# Patient Record
Sex: FEMALE | Race: WHITE | NOT HISPANIC OR LATINO | Employment: UNEMPLOYED | URBAN - METROPOLITAN AREA
[De-identification: names, ages, dates, MRNs, and addresses within clinical notes are randomized per-mention and may not be internally consistent; named-entity substitution may affect disease eponyms.]

---

## 2023-08-29 ENCOUNTER — APPOINTMENT (OUTPATIENT)
Dept: RADIOLOGY | Facility: MEDICAL CENTER | Age: 33
End: 2023-08-29
Attending: OBSTETRICS & GYNECOLOGY
Payer: OTHER MISCELLANEOUS

## 2023-08-29 ENCOUNTER — ANESTHESIA EVENT (OUTPATIENT)
Dept: OBGYN | Facility: MEDICAL CENTER | Age: 33
End: 2023-08-29
Payer: OTHER MISCELLANEOUS

## 2023-08-29 ENCOUNTER — HOSPITAL ENCOUNTER (INPATIENT)
Facility: MEDICAL CENTER | Age: 33
LOS: 2 days | End: 2023-08-31
Attending: OBSTETRICS & GYNECOLOGY | Admitting: OBSTETRICS & GYNECOLOGY
Payer: OTHER MISCELLANEOUS

## 2023-08-29 ENCOUNTER — ANESTHESIA (OUTPATIENT)
Dept: OBGYN | Facility: MEDICAL CENTER | Age: 33
End: 2023-08-29
Payer: OTHER MISCELLANEOUS

## 2023-08-29 DIAGNOSIS — G89.18 POST-OP PAIN: ICD-10-CM

## 2023-08-29 LAB
ABO GROUP BLD: NORMAL
ALBUMIN SERPL BCP-MCNC: 3.7 G/DL (ref 3.2–4.9)
ALBUMIN/GLOB SERPL: 1 G/DL
ALP SERPL-CCNC: 72 U/L (ref 30–99)
ALT SERPL-CCNC: 10 U/L (ref 2–50)
ANION GAP SERPL CALC-SCNC: 12 MMOL/L (ref 7–16)
APPEARANCE UR: CLEAR
AST SERPL-CCNC: 13 U/L (ref 12–45)
BASOPHILS # BLD AUTO: 0.3 % (ref 0–1.8)
BASOPHILS # BLD: 0.02 K/UL (ref 0–0.12)
BILIRUB SERPL-MCNC: 0.2 MG/DL (ref 0.1–1.5)
BLD GP AB SCN SERPL QL: NORMAL
BUN SERPL-MCNC: 6 MG/DL (ref 8–22)
CALCIUM ALBUM COR SERPL-MCNC: 9.8 MG/DL (ref 8.5–10.5)
CALCIUM SERPL-MCNC: 9.6 MG/DL (ref 8.5–10.5)
CHLORIDE SERPL-SCNC: 103 MMOL/L (ref 96–112)
CO2 SERPL-SCNC: 23 MMOL/L (ref 20–33)
COLOR UR AUTO: YELLOW
CREAT SERPL-MCNC: 0.54 MG/DL (ref 0.5–1.4)
EOSINOPHIL # BLD AUTO: 0.03 K/UL (ref 0–0.51)
EOSINOPHIL NFR BLD: 0.4 % (ref 0–6.9)
ERYTHROCYTE [DISTWIDTH] IN BLOOD BY AUTOMATED COUNT: 40.8 FL (ref 35.9–50)
GFR SERPLBLD CREATININE-BSD FMLA CKD-EPI: 126 ML/MIN/1.73 M 2
GLOBULIN SER CALC-MCNC: 3.6 G/DL (ref 1.9–3.5)
GLUCOSE SERPL-MCNC: 116 MG/DL (ref 65–99)
GLUCOSE UR QL STRIP.AUTO: NEGATIVE MG/DL
HBV SURFACE AG SER QL: ABNORMAL
HCT VFR BLD AUTO: 40.3 % (ref 37–47)
HCV AB SER QL: ABNORMAL
HGB BLD-MCNC: 13.2 G/DL (ref 12–16)
HIV 1+2 AB+HIV1 P24 AG SERPL QL IA: NORMAL
IMM GRANULOCYTES # BLD AUTO: 0.06 K/UL (ref 0–0.11)
IMM GRANULOCYTES NFR BLD AUTO: 0.8 % (ref 0–0.9)
KETONES UR QL STRIP.AUTO: NEGATIVE MG/DL
LEUKOCYTE ESTERASE UR QL STRIP.AUTO: NEGATIVE
LYMPHOCYTES # BLD AUTO: 0.53 K/UL (ref 1–4.8)
LYMPHOCYTES NFR BLD: 6.7 % (ref 22–41)
MCH RBC QN AUTO: 26.7 PG (ref 27–33)
MCHC RBC AUTO-ENTMCNC: 32.8 G/DL (ref 32.2–35.5)
MCV RBC AUTO: 81.6 FL (ref 81.4–97.8)
MONOCYTES # BLD AUTO: 0.4 K/UL (ref 0–0.85)
MONOCYTES NFR BLD AUTO: 5.1 % (ref 0–13.4)
NEUTROPHILS # BLD AUTO: 6.85 K/UL (ref 1.82–7.42)
NEUTROPHILS NFR BLD: 86.7 % (ref 44–72)
NITRITE UR QL STRIP.AUTO: NEGATIVE
NRBC # BLD AUTO: 0 K/UL
NRBC BLD-RTO: 0 /100 WBC (ref 0–0.2)
PH UR STRIP.AUTO: 7 [PH] (ref 5–8)
PLATELET # BLD AUTO: 168 K/UL (ref 164–446)
PMV BLD AUTO: 12.1 FL (ref 9–12.9)
POTASSIUM SERPL-SCNC: 3.7 MMOL/L (ref 3.6–5.5)
PROT SERPL-MCNC: 7.3 G/DL (ref 6–8.2)
PROT UR QL STRIP: NEGATIVE MG/DL
RBC # BLD AUTO: 4.94 M/UL (ref 4.2–5.4)
RBC UR QL AUTO: ABNORMAL
RH BLD: NORMAL
RUBV AB SER QL: <0.21 IU/ML
SODIUM SERPL-SCNC: 138 MMOL/L (ref 135–145)
SP GR UR STRIP.AUTO: 1.01 (ref 1–1.03)
T PALLIDUM AB SER QL IA: ABNORMAL
WBC # BLD AUTO: 7.9 K/UL (ref 4.8–10.8)

## 2023-08-29 PROCEDURE — 770002 HCHG ROOM/CARE - OB PRIVATE (112)

## 2023-08-29 PROCEDURE — 160035 HCHG PACU - 1ST 60 MINS PHASE I: Performed by: OBSTETRICS & GYNECOLOGY

## 2023-08-29 PROCEDURE — 99223 1ST HOSP IP/OBS HIGH 75: CPT | Performed by: OBSTETRICS & GYNECOLOGY

## 2023-08-29 PROCEDURE — 87660 TRICHOMONAS VAGIN DIR PROBE: CPT

## 2023-08-29 PROCEDURE — 87340 HEPATITIS B SURFACE AG IA: CPT

## 2023-08-29 PROCEDURE — 87480 CANDIDA DNA DIR PROBE: CPT

## 2023-08-29 PROCEDURE — 96372 THER/PROPH/DIAG INJ SC/IM: CPT

## 2023-08-29 PROCEDURE — 86901 BLOOD TYPING SEROLOGIC RH(D): CPT

## 2023-08-29 PROCEDURE — 86900 BLOOD TYPING SEROLOGIC ABO: CPT

## 2023-08-29 PROCEDURE — 86762 RUBELLA ANTIBODY: CPT

## 2023-08-29 PROCEDURE — 160046 HCHG PACU - 1ST 60 MINS PHASE II: Performed by: OBSTETRICS & GYNECOLOGY

## 2023-08-29 PROCEDURE — 86592 SYPHILIS TEST NON-TREP QUAL: CPT

## 2023-08-29 PROCEDURE — 160041 HCHG SURGERY MINUTES - EA ADDL 1 MIN LEVEL 4: Performed by: OBSTETRICS & GYNECOLOGY

## 2023-08-29 PROCEDURE — 81002 URINALYSIS NONAUTO W/O SCOPE: CPT

## 2023-08-29 PROCEDURE — 160002 HCHG RECOVERY MINUTES (STAT): Performed by: OBSTETRICS & GYNECOLOGY

## 2023-08-29 PROCEDURE — 86850 RBC ANTIBODY SCREEN: CPT

## 2023-08-29 PROCEDURE — 87389 HIV-1 AG W/HIV-1&-2 AB AG IA: CPT

## 2023-08-29 PROCEDURE — 59514 CESAREAN DELIVERY ONLY: CPT | Mod: 80 | Performed by: OBSTETRICS & GYNECOLOGY

## 2023-08-29 PROCEDURE — 86803 HEPATITIS C AB TEST: CPT

## 2023-08-29 PROCEDURE — 700111 HCHG RX REV CODE 636 W/ 250 OVERRIDE (IP): Performed by: ANESTHESIOLOGY

## 2023-08-29 PROCEDURE — C1755 CATHETER, INTRASPINAL: HCPCS | Performed by: OBSTETRICS & GYNECOLOGY

## 2023-08-29 PROCEDURE — 160025 RECOVERY II MINUTES (STATS): Performed by: OBSTETRICS & GYNECOLOGY

## 2023-08-29 PROCEDURE — 700105 HCHG RX REV CODE 258: Performed by: OBSTETRICS & GYNECOLOGY

## 2023-08-29 PROCEDURE — 85025 COMPLETE CBC W/AUTO DIFF WBC: CPT

## 2023-08-29 PROCEDURE — 160048 HCHG OR STATISTICAL LEVEL 1-5: Performed by: OBSTETRICS & GYNECOLOGY

## 2023-08-29 PROCEDURE — A9270 NON-COVERED ITEM OR SERVICE: HCPCS

## 2023-08-29 PROCEDURE — 88305 TISSUE EXAM BY PATHOLOGIST: CPT

## 2023-08-29 PROCEDURE — 64488 TAP BLOCK BI INJECTION: CPT | Performed by: OBSTETRICS & GYNECOLOGY

## 2023-08-29 PROCEDURE — 700101 HCHG RX REV CODE 250: Performed by: ANESTHESIOLOGY

## 2023-08-29 PROCEDURE — 160009 HCHG ANES TIME/MIN: Performed by: OBSTETRICS & GYNECOLOGY

## 2023-08-29 PROCEDURE — 87150 DNA/RNA AMPLIFIED PROBE: CPT

## 2023-08-29 PROCEDURE — 700102 HCHG RX REV CODE 250 W/ 637 OVERRIDE(OP)

## 2023-08-29 PROCEDURE — 87081 CULTURE SCREEN ONLY: CPT

## 2023-08-29 PROCEDURE — 160029 HCHG SURGERY MINUTES - 1ST 30 MINS LEVEL 4: Performed by: OBSTETRICS & GYNECOLOGY

## 2023-08-29 PROCEDURE — 87510 GARDNER VAG DNA DIR PROBE: CPT

## 2023-08-29 PROCEDURE — 87491 CHLMYD TRACH DNA AMP PROBE: CPT

## 2023-08-29 PROCEDURE — 80053 COMPREHEN METABOLIC PANEL: CPT

## 2023-08-29 PROCEDURE — 86780 TREPONEMA PALLIDUM: CPT

## 2023-08-29 PROCEDURE — 36415 COLL VENOUS BLD VENIPUNCTURE: CPT

## 2023-08-29 PROCEDURE — 59515 CESAREAN DELIVERY: CPT | Performed by: OBSTETRICS & GYNECOLOGY

## 2023-08-29 PROCEDURE — 700111 HCHG RX REV CODE 636 W/ 250 OVERRIDE (IP): Mod: JZ | Performed by: OBSTETRICS & GYNECOLOGY

## 2023-08-29 PROCEDURE — 99284 EMERGENCY DEPT VISIT MOD MDM: CPT

## 2023-08-29 PROCEDURE — 3E0T3BZ INTRODUCTION OF ANESTHETIC AGENT INTO PERIPHERAL NERVES AND PLEXI, PERCUTANEOUS APPROACH: ICD-10-PCS | Performed by: ANESTHESIOLOGY

## 2023-08-29 PROCEDURE — 87591 N.GONORRHOEAE DNA AMP PROB: CPT

## 2023-08-29 RX ORDER — SODIUM CHLORIDE, SODIUM LACTATE, POTASSIUM CHLORIDE, CALCIUM CHLORIDE 600; 310; 30; 20 MG/100ML; MG/100ML; MG/100ML; MG/100ML
INJECTION, SOLUTION INTRAVENOUS PRN
Status: DISCONTINUED | OUTPATIENT
Start: 2023-08-29 | End: 2023-08-31 | Stop reason: HOSPADM

## 2023-08-29 RX ORDER — LIDOCAINE HYDROCHLORIDE 20 MG/ML
INJECTION, SOLUTION EPIDURAL; INFILTRATION; INTRACAUDAL; PERINEURAL PRN
Status: DISCONTINUED | OUTPATIENT
Start: 2023-08-29 | End: 2023-08-29 | Stop reason: SURG

## 2023-08-29 RX ORDER — CALCIUM GLUCONATE 94 MG/ML
1 INJECTION, SOLUTION INTRAVENOUS
Status: DISCONTINUED | OUTPATIENT
Start: 2023-08-29 | End: 2023-08-30

## 2023-08-29 RX ORDER — OXYCODONE HYDROCHLORIDE 5 MG/1
5 TABLET ORAL EVERY 4 HOURS PRN
Status: DISCONTINUED | OUTPATIENT
Start: 2023-08-29 | End: 2023-08-31 | Stop reason: HOSPADM

## 2023-08-29 RX ORDER — IBUPROFEN 800 MG/1
800 TABLET ORAL
Status: DISCONTINUED | OUTPATIENT
Start: 2023-08-29 | End: 2023-08-30

## 2023-08-29 RX ORDER — BUPIVACAINE HYDROCHLORIDE AND EPINEPHRINE 5; 5 MG/ML; UG/ML
INJECTION, SOLUTION EPIDURAL; INTRACAUDAL; PERINEURAL
Status: DISCONTINUED
Start: 2023-08-29 | End: 2023-08-29

## 2023-08-29 RX ORDER — MAGNESIUM SULFATE HEPTAHYDRATE 40 MG/ML
2 INJECTION, SOLUTION INTRAVENOUS ONCE
Status: COMPLETED | OUTPATIENT
Start: 2023-08-29 | End: 2023-08-29

## 2023-08-29 RX ORDER — INDOMETHACIN 50 MG/1
100 CAPSULE ORAL ONCE
Status: COMPLETED | OUTPATIENT
Start: 2023-08-29 | End: 2023-08-29

## 2023-08-29 RX ORDER — OXYTOCIN 10 [USP'U]/ML
INJECTION, SOLUTION INTRAMUSCULAR; INTRAVENOUS PRN
Status: DISCONTINUED | OUTPATIENT
Start: 2023-08-29 | End: 2023-08-29 | Stop reason: SURG

## 2023-08-29 RX ORDER — BETAMETHASONE SODIUM PHOSPHATE AND BETAMETHASONE ACETATE 3; 3 MG/ML; MG/ML
12 INJECTION, SUSPENSION INTRA-ARTICULAR; INTRALESIONAL; INTRAMUSCULAR; SOFT TISSUE EVERY 24 HOURS
Status: DISCONTINUED | OUTPATIENT
Start: 2023-08-29 | End: 2023-08-30

## 2023-08-29 RX ORDER — SODIUM CHLORIDE, SODIUM LACTATE, POTASSIUM CHLORIDE, CALCIUM CHLORIDE 600; 310; 30; 20 MG/100ML; MG/100ML; MG/100ML; MG/100ML
INJECTION, SOLUTION INTRAVENOUS CONTINUOUS
Status: DISCONTINUED | OUTPATIENT
Start: 2023-08-29 | End: 2023-08-30

## 2023-08-29 RX ORDER — MAGNESIUM SULFATE HEPTAHYDRATE 40 MG/ML
2 INJECTION, SOLUTION INTRAVENOUS CONTINUOUS
Status: DISCONTINUED | OUTPATIENT
Start: 2023-08-29 | End: 2023-08-30

## 2023-08-29 RX ORDER — ONDANSETRON 2 MG/ML
INJECTION INTRAMUSCULAR; INTRAVENOUS PRN
Status: DISCONTINUED | OUTPATIENT
Start: 2023-08-29 | End: 2023-08-29 | Stop reason: SURG

## 2023-08-29 RX ORDER — TERBUTALINE SULFATE 1 MG/ML
0.25 INJECTION, SOLUTION SUBCUTANEOUS
Status: DISCONTINUED | OUTPATIENT
Start: 2023-08-29 | End: 2023-08-30

## 2023-08-29 RX ORDER — INDOMETHACIN 25 MG/1
25 CAPSULE ORAL EVERY 6 HOURS
Status: DISCONTINUED | OUTPATIENT
Start: 2023-08-30 | End: 2023-08-30

## 2023-08-29 RX ORDER — OXYTOCIN 10 [USP'U]/ML
10 INJECTION, SOLUTION INTRAMUSCULAR; INTRAVENOUS
Status: DISCONTINUED | OUTPATIENT
Start: 2023-08-29 | End: 2023-08-30

## 2023-08-29 RX ORDER — DEXAMETHASONE SODIUM PHOSPHATE 4 MG/ML
INJECTION, SOLUTION INTRA-ARTICULAR; INTRALESIONAL; INTRAMUSCULAR; INTRAVENOUS; SOFT TISSUE PRN
Status: DISCONTINUED | OUTPATIENT
Start: 2023-08-29 | End: 2023-08-29 | Stop reason: SURG

## 2023-08-29 RX ORDER — CEFAZOLIN SODIUM 1 G/3ML
INJECTION, POWDER, FOR SOLUTION INTRAMUSCULAR; INTRAVENOUS PRN
Status: DISCONTINUED | OUTPATIENT
Start: 2023-08-29 | End: 2023-08-29 | Stop reason: SURG

## 2023-08-29 RX ORDER — MAGNESIUM SULFATE HEPTAHYDRATE 40 MG/ML
4 INJECTION, SOLUTION INTRAVENOUS ONCE
Status: COMPLETED | OUTPATIENT
Start: 2023-08-29 | End: 2023-08-29

## 2023-08-29 RX ORDER — DIPHENHYDRAMINE HYDROCHLORIDE 50 MG/ML
25 INJECTION INTRAMUSCULAR; INTRAVENOUS EVERY 6 HOURS PRN
Status: DISCONTINUED | OUTPATIENT
Start: 2023-08-29 | End: 2023-08-31 | Stop reason: HOSPADM

## 2023-08-29 RX ORDER — DIPHENHYDRAMINE HCL 25 MG
25 TABLET ORAL EVERY 6 HOURS PRN
Status: DISCONTINUED | OUTPATIENT
Start: 2023-08-29 | End: 2023-08-31 | Stop reason: HOSPADM

## 2023-08-29 RX ORDER — MIDAZOLAM HYDROCHLORIDE 1 MG/ML
INJECTION INTRAMUSCULAR; INTRAVENOUS PRN
Status: DISCONTINUED | OUTPATIENT
Start: 2023-08-29 | End: 2023-08-29 | Stop reason: SURG

## 2023-08-29 RX ORDER — KETOROLAC TROMETHAMINE 30 MG/ML
INJECTION, SOLUTION INTRAMUSCULAR; INTRAVENOUS PRN
Status: DISCONTINUED | OUTPATIENT
Start: 2023-08-29 | End: 2023-08-29 | Stop reason: SURG

## 2023-08-29 RX ORDER — ONDANSETRON 2 MG/ML
4 INJECTION INTRAMUSCULAR; INTRAVENOUS EVERY 6 HOURS PRN
Status: DISCONTINUED | OUTPATIENT
Start: 2023-08-29 | End: 2023-08-31 | Stop reason: HOSPADM

## 2023-08-29 RX ORDER — DOCUSATE SODIUM 100 MG/1
100 CAPSULE, LIQUID FILLED ORAL 2 TIMES DAILY PRN
Status: DISCONTINUED | OUTPATIENT
Start: 2023-08-29 | End: 2023-08-31 | Stop reason: HOSPADM

## 2023-08-29 RX ORDER — ACETAMINOPHEN 500 MG
1000 TABLET ORAL
Status: DISCONTINUED | OUTPATIENT
Start: 2023-08-29 | End: 2023-08-30

## 2023-08-29 RX ORDER — LIDOCAINE HYDROCHLORIDE 10 MG/ML
20 INJECTION, SOLUTION INFILTRATION; PERINEURAL
Status: DISCONTINUED | OUTPATIENT
Start: 2023-08-29 | End: 2023-08-30

## 2023-08-29 RX ORDER — HYDROMORPHONE HYDROCHLORIDE 1 MG/ML
INJECTION, SOLUTION INTRAMUSCULAR; INTRAVENOUS; SUBCUTANEOUS PRN
Status: DISCONTINUED | OUTPATIENT
Start: 2023-08-29 | End: 2023-08-29 | Stop reason: SURG

## 2023-08-29 RX ORDER — BUPIVACAINE HYDROCHLORIDE AND EPINEPHRINE 2.5; 5 MG/ML; UG/ML
INJECTION, SOLUTION EPIDURAL; INFILTRATION; INTRACAUDAL; PERINEURAL
Status: COMPLETED | OUTPATIENT
Start: 2023-08-29 | End: 2023-08-29

## 2023-08-29 RX ORDER — ONDANSETRON 4 MG/1
4 TABLET, ORALLY DISINTEGRATING ORAL EVERY 6 HOURS PRN
Status: DISCONTINUED | OUTPATIENT
Start: 2023-08-29 | End: 2023-08-31 | Stop reason: HOSPADM

## 2023-08-29 RX ADMIN — INDOMETHACIN 100 MG: 50 CAPSULE ORAL at 17:28

## 2023-08-29 RX ADMIN — DEXAMETHASONE SODIUM PHOSPHATE 8 MG: 4 INJECTION INTRA-ARTICULAR; INTRALESIONAL; INTRAMUSCULAR; INTRAVENOUS; SOFT TISSUE at 18:43

## 2023-08-29 RX ADMIN — OXYTOCIN 20 UNITS: 10 INJECTION, SOLUTION INTRAMUSCULAR; INTRAVENOUS at 18:42

## 2023-08-29 RX ADMIN — FENTANYL CITRATE 50 MCG: 50 INJECTION, SOLUTION INTRAMUSCULAR; INTRAVENOUS at 18:52

## 2023-08-29 RX ADMIN — ONDANSETRON 4 MG: 2 INJECTION INTRAMUSCULAR; INTRAVENOUS at 19:08

## 2023-08-29 RX ADMIN — FENTANYL CITRATE 50 MCG: 50 INJECTION, SOLUTION INTRAMUSCULAR; INTRAVENOUS at 18:45

## 2023-08-29 RX ADMIN — CEFAZOLIN 2 G: 1 INJECTION, POWDER, FOR SOLUTION INTRAMUSCULAR; INTRAVENOUS at 18:45

## 2023-08-29 RX ADMIN — FENTANYL CITRATE 50 MCG: 50 INJECTION, SOLUTION INTRAMUSCULAR; INTRAVENOUS at 19:00

## 2023-08-29 RX ADMIN — SODIUM CHLORIDE, POTASSIUM CHLORIDE, SODIUM LACTATE AND CALCIUM CHLORIDE: 600; 310; 30; 20 INJECTION, SOLUTION INTRAVENOUS at 18:34

## 2023-08-29 RX ADMIN — KETOROLAC TROMETHAMINE 30 MG: 30 INJECTION, SOLUTION INTRAMUSCULAR; INTRAVENOUS at 19:20

## 2023-08-29 RX ADMIN — MAGNESIUM SULFATE HEPTAHYDRATE 4 G: 40 INJECTION, SOLUTION INTRAVENOUS at 17:24

## 2023-08-29 RX ADMIN — SODIUM CHLORIDE, POTASSIUM CHLORIDE, SODIUM LACTATE AND CALCIUM CHLORIDE: 600; 310; 30; 20 INJECTION, SOLUTION INTRAVENOUS at 17:24

## 2023-08-29 RX ADMIN — BUPIVACAINE HYDROCHLORIDE AND EPINEPHRINE BITARTRATE 60 ML: 2.5; .0091 INJECTION, SOLUTION EPIDURAL; INFILTRATION; INTRACAUDAL; PERINEURAL at 19:47

## 2023-08-29 RX ADMIN — MIDAZOLAM 2 MG: 1 INJECTION, SOLUTION INTRAMUSCULAR; INTRAVENOUS at 18:46

## 2023-08-29 RX ADMIN — FENTANYL CITRATE 50 MCG: 50 INJECTION, SOLUTION INTRAMUSCULAR; INTRAVENOUS at 19:50

## 2023-08-29 RX ADMIN — Medication 100 MG: at 18:39

## 2023-08-29 RX ADMIN — OXYTOCIN 125 ML/HR: 10 INJECTION, SOLUTION INTRAMUSCULAR; INTRAVENOUS at 19:09

## 2023-08-29 RX ADMIN — MAGNESIUM SULFATE IN WATER 2 G/HR: 40 INJECTION, SOLUTION INTRAVENOUS at 17:56

## 2023-08-29 RX ADMIN — BETAMETHASONE SODIUM PHOSPHATE AND BETAMETHASONE ACETATE 12 MG: 3; 3 INJECTION, SUSPENSION INTRA-ARTICULAR; INTRALESIONAL; INTRAMUSCULAR at 17:29

## 2023-08-29 RX ADMIN — MAGNESIUM SULFATE HEPTAHYDRATE 2 G: 2 INJECTION, SOLUTION INTRAVENOUS at 17:44

## 2023-08-29 RX ADMIN — FENTANYL CITRATE 50 MCG: 50 INJECTION, SOLUTION INTRAMUSCULAR; INTRAVENOUS at 18:48

## 2023-08-29 RX ADMIN — HYDROMORPHONE HYDROCHLORIDE 0.5 MG: 1 INJECTION, SOLUTION INTRAMUSCULAR; INTRAVENOUS; SUBCUTANEOUS at 19:53

## 2023-08-29 RX ADMIN — LIDOCAINE HYDROCHLORIDE 60 MG: 20 INJECTION, SOLUTION EPIDURAL; INFILTRATION; INTRACAUDAL at 18:39

## 2023-08-29 RX ADMIN — PROPOFOL 150 MG: 10 INJECTION, EMULSION INTRAVENOUS at 18:39

## 2023-08-29 ASSESSMENT — LIFESTYLE VARIABLES
HAVE PEOPLE ANNOYED YOU BY CRITICIZING YOUR DRINKING: NO
DOES PATIENT WANT TO STOP DRINKING: NO
ALCOHOL_USE: NO
EVER HAD A DRINK FIRST THING IN THE MORNING TO STEADY YOUR NERVES TO GET RID OF A HANGOVER: NO
HAVE YOU EVER FELT YOU SHOULD CUT DOWN ON YOUR DRINKING: NO
TOTAL SCORE: 0
EVER FELT BAD OR GUILTY ABOUT YOUR DRINKING: NO
TOTAL SCORE: 0
TOTAL SCORE: 0
CONSUMPTION TOTAL: INCOMPLETE

## 2023-08-29 ASSESSMENT — PAIN SCALES - GENERAL: PAIN_LEVEL: 0

## 2023-08-29 ASSESSMENT — COPD QUESTIONNAIRES
IN THE PAST 12 MONTHS DO YOU DO LESS THAN YOU USED TO BECAUSE OF YOUR BREATHING PROBLEMS: DISAGREE/UNSURE
HAVE YOU SMOKED AT LEAST 100 CIGARETTES IN YOUR ENTIRE LIFE: NO/DON'T KNOW
DURING THE PAST 4 WEEKS HOW MUCH DID YOU FEEL SHORT OF BREATH: NONE/LITTLE OF THE TIME
DO YOU EVER COUGH UP ANY MUCUS OR PHLEGM?: NO/ONLY WITH OCCASIONAL COLDS OR INFECTIONS
COPD SCREENING SCORE: 0

## 2023-08-29 ASSESSMENT — PAIN DESCRIPTION - PAIN TYPE: TYPE: ACUTE PAIN

## 2023-08-30 VITALS
DIASTOLIC BLOOD PRESSURE: 64 MMHG | BODY MASS INDEX: 21.47 KG/M2 | HEART RATE: 82 BPM | SYSTOLIC BLOOD PRESSURE: 108 MMHG | RESPIRATION RATE: 16 BRPM | HEIGHT: 70 IN | TEMPERATURE: 97.5 F | OXYGEN SATURATION: 97 % | WEIGHT: 150 LBS

## 2023-08-30 LAB
C TRACH DNA GENITAL QL NAA+PROBE: NEGATIVE
CANDIDA DNA VAG QL PROBE+SIG AMP: NEGATIVE
ERYTHROCYTE [DISTWIDTH] IN BLOOD BY AUTOMATED COUNT: 41.8 FL (ref 35.9–50)
G VAGINALIS DNA VAG QL PROBE+SIG AMP: POSITIVE
GP B STREP DNA SPEC QL NAA+PROBE: POSITIVE
HCT VFR BLD AUTO: 37.1 % (ref 37–47)
HGB BLD-MCNC: 12.1 G/DL (ref 12–16)
MCH RBC QN AUTO: 26.9 PG (ref 27–33)
MCHC RBC AUTO-ENTMCNC: 32.6 G/DL (ref 32.2–35.5)
MCV RBC AUTO: 82.4 FL (ref 81.4–97.8)
N GONORRHOEA DNA GENITAL QL NAA+PROBE: NEGATIVE
PATHOLOGY CONSULT NOTE: NORMAL
PLATELET # BLD AUTO: 160 K/UL (ref 164–446)
PMV BLD AUTO: 12.6 FL (ref 9–12.9)
RBC # BLD AUTO: 4.5 M/UL (ref 4.2–5.4)
SPECIMEN SOURCE: NORMAL
T VAGINALIS DNA VAG QL PROBE+SIG AMP: NEGATIVE
WBC # BLD AUTO: 11.9 K/UL (ref 4.8–10.8)

## 2023-08-30 PROCEDURE — A9270 NON-COVERED ITEM OR SERVICE: HCPCS

## 2023-08-30 PROCEDURE — 85027 COMPLETE CBC AUTOMATED: CPT

## 2023-08-30 PROCEDURE — 700111 HCHG RX REV CODE 636 W/ 250 OVERRIDE (IP): Performed by: OBSTETRICS & GYNECOLOGY

## 2023-08-30 PROCEDURE — A9270 NON-COVERED ITEM OR SERVICE: HCPCS | Performed by: FAMILY MEDICINE

## 2023-08-30 PROCEDURE — 700102 HCHG RX REV CODE 250 W/ 637 OVERRIDE(OP): Performed by: FAMILY MEDICINE

## 2023-08-30 PROCEDURE — 700105 HCHG RX REV CODE 258: Performed by: OBSTETRICS & GYNECOLOGY

## 2023-08-30 PROCEDURE — 96375 TX/PRO/DX INJ NEW DRUG ADDON: CPT

## 2023-08-30 PROCEDURE — 700102 HCHG RX REV CODE 250 W/ 637 OVERRIDE(OP)

## 2023-08-30 PROCEDURE — 700102 HCHG RX REV CODE 250 W/ 637 OVERRIDE(OP): Performed by: OBSTETRICS & GYNECOLOGY

## 2023-08-30 PROCEDURE — 770002 HCHG ROOM/CARE - OB PRIVATE (112)

## 2023-08-30 PROCEDURE — 36415 COLL VENOUS BLD VENIPUNCTURE: CPT

## 2023-08-30 PROCEDURE — A9270 NON-COVERED ITEM OR SERVICE: HCPCS | Performed by: OBSTETRICS & GYNECOLOGY

## 2023-08-30 RX ORDER — SODIUM CHLORIDE, SODIUM LACTATE, POTASSIUM CHLORIDE, CALCIUM CHLORIDE 600; 310; 30; 20 MG/100ML; MG/100ML; MG/100ML; MG/100ML
INJECTION, SOLUTION INTRAVENOUS ONCE
Status: DISCONTINUED | OUTPATIENT
Start: 2023-08-30 | End: 2023-08-31 | Stop reason: ALTCHOICE

## 2023-08-30 RX ORDER — OXYCODONE HCL 5 MG/5 ML
5 SOLUTION, ORAL ORAL
Status: DISCONTINUED | OUTPATIENT
Start: 2023-08-30 | End: 2023-08-31 | Stop reason: HOSPADM

## 2023-08-30 RX ORDER — OXYTOCIN 10 [USP'U]/ML
10 INJECTION, SOLUTION INTRAMUSCULAR; INTRAVENOUS
Status: DISCONTINUED | OUTPATIENT
Start: 2023-08-30 | End: 2023-08-31

## 2023-08-30 RX ORDER — ONDANSETRON 2 MG/ML
4 INJECTION INTRAMUSCULAR; INTRAVENOUS
Status: DISCONTINUED | OUTPATIENT
Start: 2023-08-30 | End: 2023-08-31

## 2023-08-30 RX ORDER — ACETAMINOPHEN 500 MG
1000 TABLET ORAL EVERY 6 HOURS PRN
Status: DISCONTINUED | OUTPATIENT
Start: 2023-08-30 | End: 2023-08-30

## 2023-08-30 RX ORDER — OXYCODONE HCL 5 MG/5 ML
10 SOLUTION, ORAL ORAL
Status: DISCONTINUED | OUTPATIENT
Start: 2023-08-30 | End: 2023-08-31 | Stop reason: HOSPADM

## 2023-08-30 RX ORDER — IBUPROFEN 800 MG/1
800 TABLET ORAL 3 TIMES DAILY PRN
Status: DISCONTINUED | OUTPATIENT
Start: 2023-08-30 | End: 2023-08-31 | Stop reason: HOSPADM

## 2023-08-30 RX ORDER — ACETAMINOPHEN 500 MG
1000 TABLET ORAL EVERY 6 HOURS PRN
Status: DISCONTINUED | OUTPATIENT
Start: 2023-08-30 | End: 2023-08-31 | Stop reason: HOSPADM

## 2023-08-30 RX ORDER — HYDROMORPHONE HYDROCHLORIDE 1 MG/ML
0.25 INJECTION, SOLUTION INTRAMUSCULAR; INTRAVENOUS; SUBCUTANEOUS ONCE
Status: ACTIVE | OUTPATIENT
Start: 2023-08-30 | End: 2023-08-31

## 2023-08-30 RX ORDER — DIPHENHYDRAMINE HYDROCHLORIDE 50 MG/ML
12.5 INJECTION INTRAMUSCULAR; INTRAVENOUS
Status: DISCONTINUED | OUTPATIENT
Start: 2023-08-30 | End: 2023-08-31

## 2023-08-30 RX ORDER — ACETAMINOPHEN 500 MG
1000 TABLET ORAL EVERY 6 HOURS
Status: DISCONTINUED | OUTPATIENT
Start: 2023-08-30 | End: 2023-08-30

## 2023-08-30 RX ORDER — MEPERIDINE HYDROCHLORIDE 25 MG/ML
6.25 INJECTION INTRAMUSCULAR; INTRAVENOUS; SUBCUTANEOUS
Status: DISCONTINUED | OUTPATIENT
Start: 2023-08-30 | End: 2023-08-31

## 2023-08-30 RX ORDER — SODIUM CHLORIDE, SODIUM GLUCONATE, SODIUM ACETATE, POTASSIUM CHLORIDE AND MAGNESIUM CHLORIDE 526; 502; 368; 37; 30 MG/100ML; MG/100ML; MG/100ML; MG/100ML; MG/100ML
500 INJECTION, SOLUTION INTRAVENOUS CONTINUOUS
Status: DISCONTINUED | OUTPATIENT
Start: 2023-08-30 | End: 2023-08-31 | Stop reason: ALTCHOICE

## 2023-08-30 RX ORDER — HALOPERIDOL 5 MG/ML
1 INJECTION INTRAMUSCULAR
Status: DISCONTINUED | OUTPATIENT
Start: 2023-08-30 | End: 2023-08-31

## 2023-08-30 RX ORDER — HYDROMORPHONE HYDROCHLORIDE 1 MG/ML
0.1 INJECTION, SOLUTION INTRAMUSCULAR; INTRAVENOUS; SUBCUTANEOUS
Status: DISCONTINUED | OUTPATIENT
Start: 2023-08-30 | End: 2023-08-31

## 2023-08-30 RX ORDER — ACETAMINOPHEN 500 MG
1000 TABLET ORAL EVERY 6 HOURS PRN
Status: DISCONTINUED | OUTPATIENT
Start: 2023-09-04 | End: 2023-08-30

## 2023-08-30 RX ORDER — HYDROMORPHONE HYDROCHLORIDE 1 MG/ML
0.4 INJECTION, SOLUTION INTRAMUSCULAR; INTRAVENOUS; SUBCUTANEOUS
Status: DISCONTINUED | OUTPATIENT
Start: 2023-08-30 | End: 2023-08-31

## 2023-08-30 RX ORDER — KETOROLAC TROMETHAMINE 30 MG/ML
30 INJECTION, SOLUTION INTRAMUSCULAR; INTRAVENOUS EVERY 6 HOURS
Status: DISPENSED | OUTPATIENT
Start: 2023-08-30 | End: 2023-08-30

## 2023-08-30 RX ORDER — HYDROMORPHONE HYDROCHLORIDE 1 MG/ML
0.2 INJECTION, SOLUTION INTRAMUSCULAR; INTRAVENOUS; SUBCUTANEOUS
Status: DISCONTINUED | OUTPATIENT
Start: 2023-08-30 | End: 2023-08-31

## 2023-08-30 RX ADMIN — INDOMETHACIN 25 MG: 25 CAPSULE ORAL at 02:39

## 2023-08-30 RX ADMIN — ACETAMINOPHEN 1000 MG: 500 TABLET, FILM COATED ORAL at 13:07

## 2023-08-30 RX ADMIN — KETOROLAC TROMETHAMINE 30 MG: 30 INJECTION, SOLUTION INTRAMUSCULAR; INTRAVENOUS at 06:34

## 2023-08-30 RX ADMIN — ACETAMINOPHEN 1000 MG: 500 TABLET, FILM COATED ORAL at 18:08

## 2023-08-30 RX ADMIN — IBUPROFEN 800 MG: 800 TABLET, FILM COATED ORAL at 18:14

## 2023-08-30 RX ADMIN — CEFAZOLIN 2 G: 2 INJECTION, POWDER, FOR SOLUTION INTRAMUSCULAR; INTRAVENOUS at 02:44

## 2023-08-30 RX ADMIN — ACETAMINOPHEN 1000 MG: 500 TABLET, FILM COATED ORAL at 06:34

## 2023-08-30 RX ADMIN — CEFAZOLIN 2 G: 2 INJECTION, POWDER, FOR SOLUTION INTRAMUSCULAR; INTRAVENOUS at 18:09

## 2023-08-30 RX ADMIN — ACETAMINOPHEN 1000 MG: 500 TABLET, FILM COATED ORAL at 21:22

## 2023-08-30 RX ADMIN — CEFAZOLIN 2 G: 2 INJECTION, POWDER, FOR SOLUTION INTRAMUSCULAR; INTRAVENOUS at 11:30

## 2023-08-30 ASSESSMENT — PAIN DESCRIPTION - PAIN TYPE: TYPE: SURGICAL PAIN

## 2023-08-30 NOTE — ANESTHESIA TIME REPORT
Anesthesia Start and Stop Event Times     Date Time Event    8/29/2023 1834 Ready for Procedure     1834 Anesthesia Start     2000 Anesthesia Stop        Responsible Staff  08/29/23    Name Role Begin End    Marii London M.D. Anesth 1834 2000        Overtime Reason:  no overtime (within assigned shift)    Comments:

## 2023-08-30 NOTE — PROGRESS NOTES
0700- Report from IAN Aguirre. Care assumed. Pt and FOB resting at this time with infant in cuddle cot in room.     0800- RN to bedside with IBCLC. Pt and support sleeping. Deferred lactation information until a later time.     0915- RN to bedside. Pt states no needs at this time. Plan for the day and visitors discussed. Pt coping appropriately at this time. Pt plans to suppress milk production. Introductory information given on suppression.     1900- Report to Teresa. IAN. Care relinquished.

## 2023-08-30 NOTE — OP REPORT
Operative Report -     Patient: Lora Eaton  MRN: [id number]         Date of Surgery: 2023    Pre-operative Diagnosis:   1. IUP at 24w2d  2. Fetal bradycardia  3. Breech presentation  4. Advanced dilation  5.  labor vs cervical incompetence  6. Sludge noted in amniotic sac  7. Lay midwifery care  Post-operative Diagnosis: same  Procedure: Primary Classical  Section via Pfannenstiel incision  Surgeon(s): DO Karthik  Assistant(s): MD Wanda  Anesthesia: General  Findings: 24wk breech fetus delivered en-cul with delivery of head first; normal uterus, tubes and ovaries bilaterally.  See infant delivery account for details as infant was coded and no apgars documented.  Complications: none  Specimens: none  UOP: 500 mL  EBL: 500 mL    Indications:   Pt is a 32 y.o.  at 24w2d presented to triage with advanced cervical dilation and BOW in the vagina in breech presentation.  STAT BTMS was given and magnesium 6/2 initiated along with indocin.  MFM consult obtained and formal US obtained with accurate ROSE 5 days from LMP.  Active fetal movement during bedside US and all normal anatomy survey.  After US wolff catheter was placed and then when RN attempted to re-establish electronic fetal monitoring there was difficulty in finding heart rate.  US was brought back in and fetal heart rate was visualized as markedly slow.  Emergent transfer to OR was called and undertaken without any delay.      Procedure:  The patient was taken to the operating room and fetal heart tones were again auscultated with the external doppler and noted to be in the 60s.  Belly was splashed with betadine and sterilely draped.  General anesthesia was induced and emergent  begun immediately thereafter.    A Pfanenstiel skin incision was made with the scalpel and carried through to the underlying layer of fascia.  The fascia was then incised in the midline and the incision was extended laterally  bluntly. The rectus muscles were  bluntly and the peritoneum entered bluntly.    Bladder blade was inserted and the uterus incised in a VERTICAL fashion.  Fetal breech was found to be quite low and therefore head was identified and elevated and the fetus delivered through the hysterotomy en cul.  Amniotic sac was ruptured and cord was immediately clamped and cut and infant handed off to awaiting NICU staff.    The Kendall retractor was inserted and the vesicouterine peritoneum identified, grasped with pick ups and entered sharply with Metzenbaum scissors.  The incision was extended laterally and the bladder flap created digitally.  The lower uterine segment incised in a low transverse fashion with the scalpel. The uterine incision was extended bluntly with cephalad-caudad traction.    The infant was delivered atraumatically; the nose and mouth were suctioned and the cord was clamped and cut. The infant was handed off to the waiting staff. The placenta was removed manually and the uterus was cleared of all clots and debris.  The uterine incision was repaired with 0-vicryl in two layers.  Additional areas of torn serosa repaired with 3-0 vicryl.    The gutters were cleared of all clots and debris using copious irrigation. The uterus was then re-inspected to ensure hemostasis and sepra film applied over the incision.  The subfascial tissue was inspected and 2 small button holes superior to the incision were repaired with 3-0 vicryl. The rectus muscle was re-approximated with 0-monocryl in a mattress fashion.  The fascia was re-approximated with 0-vicryl in a running fashion. The subcutaneous tissue was re-approximated using 3-0 vicryl in a running fashion. The skin was closed with 4-0 vicryl.    The patient tolerated the procedure well. Sponge, lap and needle counts were correct times three. The patient was taken to recovery in stable condition.    Debo Angeles D.O.

## 2023-08-30 NOTE — PROGRESS NOTES
MFM completed US and discussed plan with patient to try to prolong pregnancy if possible with Mag and Indocin while getting through BTMS window. RN placed wolff catheter and immediately after went to place baby back on fetal monitor and could not find heart tones.  US brought in and I visualized VERY SLOW heart rate.  Patient was IMMEDIATELY transferred to OR and heart tones in OR again found to be in the 60s.  Patient was splashed with betadine and emergency  undertaken.  See op note.    Debo Angeles D.O.

## 2023-08-30 NOTE — ANESTHESIA PREPROCEDURE EVALUATION
Case: [id number] Anesthesia Start Date/Time: 23 1834    Procedure:  SECTION, PRIMARY (Abdomen)    Anesthesia type: Spinal    Pre-op diagnosis: 24.2 WEEKS FETAL INTOLERANCE    Location: [facility]    Surgeons: Debo Angeles D.O.        31 yo  oliveros IUP at 24-2 in PTL with breech presentation, advanced dilation and fetal bradycardia for crash .    plt 168  Relevant Problems   No relevant active problems       Physical Exam    Airway - unable to assess       Cardiovascular    Dental    Pulmonary    Abdominal    Neurological              Anesthesia Plan    ASA 2- EMERGENT   ASA physical status emergent criteria: non-reassuring fetal heart tones    Plan - peripheral nerve block and general     Peripheral nerve block will be post-op pain control  Airway plan will be ETT          Induction: rapid sequence and intravenous    Postoperative Plan: Postoperative administration of opioids is intended.    Pertinent diagnostic labs and testing reviewed    Informed Consent:    Anesthetic plan and risks discussed with patient and spouse.    Use of blood products discussed with: patient and spouse whom.

## 2023-08-30 NOTE — ANESTHESIA POSTPROCEDURE EVALUATION
Patient: Lora Eaton    Procedure Summary     Date: 23 Room / Location: [facility]    Anesthesia Start:  Anesthesia Stop:     Procedure:  SECTION, PRIMARY (Abdomen) Diagnosis: (24.2 WEEKS FETAL INTOLERANCE)    Surgeons: Debo Angeles D.O. Responsible Provider: Marii London M.D.    Anesthesia Type: peripheral nerve block, general ASA Status: 2 - Emergent          Final Anesthesia Type: peripheral nerve block, general  Last vitals  BP   114/64   Temp   97.5   Pulse 110     Resp   16    SpO2   100%     Anesthesia Post Evaluation    Patient location during evaluation: PACU  Patient participation: complete - patient participated  Level of consciousness: sleepy but conscious  Pain score: 0    Airway patency: patent  Anesthetic complications: no  Cardiovascular status: hemodynamically stable  Respiratory status: acceptable  Hydration status: euvolemic    PONV: none    patient able to participate, but full recovery from regional anesthesia has not occurred and is not expected within the stipulated timeframe for the completion of the evaluation      No notable events documented.

## 2023-08-30 NOTE — H&P
LABOR & DELIVERY TRIAGE HISTORY AND PHYSICAL  Patient Name: Lora Eaton Age/Sex: 32 y.o. female  : 1990 MRN: [id number]        HISTORY OF PRESENT ILLNESS:   Lora Eaton is a 32 y.o.  at 24w2d weeks gestation by LMP. She receives her care with a midwife Ayaan Fuentes. She reports that she has not had labwork or an anatomy ultrasound with this pregnancy. She has been lyssa since . Her midwife checked her yesterday and said that she was closed. Today she continues to contract and is breathing though contractions but able to converse.      Fetal movement: yes  Leakage of Fluid: no  Vaginal Bleeding: yes, bloody show  Contractions: yes     Her EDC is 2023, by Patient Reported.   She has received prenatal care with a midwife.  Complications during pregnancy:    contractions  History of one full term delivery      OBSTETRICAL HISTORY:                    OB History    Para Term  AB Living   1             SAB IAB Ectopic Molar Multiple Live Births                      # Outcome Date GA Lbr Kleber/2nd Weight Sex Delivery Anes PTL Lv   1 Current                           MEDICAL HISTORY:  Past Medical History   No past medical history on file.        SURGICAL HISTORY:  Past Surgical History   No past surgical history on file.        MEDICATIONS:  Prescriptions Prior to Admission   No medications prior to admission.            ALLERGIES:  No Known Allergies      SOCIAL HISTORY:   Tobacco use: no  Alcohol use: no  Recreational drug use: no     FAMILY HISTORY:  Clotting/bleeding disorders: no  Gynecological cancers: no  Family History   No family history on file.        REVIEW OF SYSTEMS:  Pertinent items are noted in HPI.        PHYSICAL EXAM:  Temp:  [36.1 °C (97 °F)] 36.1 °C (97 °F)  Pulse:  [118] 118  Resp:  [16] 16  BP: (110)/(71) 110/71  SpO2:  [100 %] 100 %  Body mass index is 21.52 kg/m².           General:  AAOx3, NAD, pleasant disposition    Pulmonary:   Non-labored respirations    Abdomen: Soft and non-tender, gravid uterus    FHT: 155/mod/+10x10 accels/possible early   Contractions: Yes, irregular   Presentation: Breech by bedside ultrasound    SVE: Bulging membranes      Bedside Ultrasound:   Breech presentation      Prenatal Labs:  HepBSAg pending  HIV pending  RPR pending  Rubella pending  ABO pending     Group B Strep: pending        ASSESSMENT/ PLAN:   Lora Eaton is a 32 y.o.  at 24w2d gestation who presents with contractions and was found to have a bulging membranes on exam  - Breech presentation with uncertain dating     1. Admit to L&D  2. Magnesium sulfate for neuroprotection  3. BTMZ ordered  4. Indocin for tocolysis  5. MFM in house, asked to perform ultrasound for dating and cervix evaluation   6. PCN for GBS ppx  7. Patient counseled on breech presentation and likely need for  section   8. Prenatal labs and GBS pending        Brandy Muñoz M.D.  Obstetrics and Gynecology  2023    5:27 PM

## 2023-08-30 NOTE — CARE PLAN
The patient is Stable - Low risk of patient condition declining or worsening    Shift Goals  Clinical Goals: VSS  Patient Goals: Spend time with baby  Family Goals: Emotional support    Progress made toward(s) clinical / shift goals:    Problem: Risk for Infection and Impaired Wound Healing  Goal: Patient will remain free from infection  Outcome: Progressing     Problem: Pain  Goal: Patient's pain will be alleviated or reduced to the patient’s comfort goal  Outcome: Progressing     Problem: Psychosocial - Postpartum  Goal: Patient will verbalize and demonstrate effective bonding and parenting behavior  Outcome: Progressing  Note: Holding infant     Problem: Infection - Postpartum  Goal: Postpartum patient will be free of signs and symptoms of infection  Outcome: Progressing

## 2023-08-30 NOTE — CARE PLAN
Problem: Psychosocial - L&D  Goal: Patient's level of anxiety will decrease  Outcome: Not Met  Goal: Patient will be able to discuss coping skills during hospitalization  Outcome: Progressing  Goal: Patient's ability to re-evaluate and adapt role responsibilities will improve  Outcome: Progressing  Goal: Spiritual and cultural needs incorporated into hospitalization  Outcome: Progressing     Problem: Risk for Infection and Impaired Wound Healing  Goal: Patient will remain free from infection  Outcome: Progressing     Problem: Pain  Goal: Patient's pain will be alleviated or reduced to the patient’s comfort goal  Outcome: Not Met   The patient is Watcher - Medium risk of patient condition declining or worsening    Shift Goals  Clinical Goals: stop pre-term labor  Patient Goals: stop pre-term labor, remain calm  Family Goals: provide support        Problem: Psychosocial - L&D  Goal: Patient's level of anxiety will decrease  Outcome: Not Met     Problem: Pain  Goal: Patient's pain will be alleviated or reduced to the patient’s comfort goal  Outcome: Not Met

## 2023-08-30 NOTE — ANESTHESIA PROCEDURE NOTES
Peripheral Block    Date/Time: 8/29/2023 7:41 PM    Performed by: Marii London M.D.  Authorized by: Marii London M.D.    Patient Location:  OB  Start Time:  8/29/2023 7:41 PM  End Time:  8/29/2023 7:48 PM  Reason for Block: at surgeon's request and post-op pain management ONLY    patient identified, IV checked, site marked, risks and benefits discussed, surgical consent, monitors and equipment checked, pre-op evaluation and timeout performed    Patient Position:  Supine  Prep: ChloraPrep    Monitoring:  Heart rate, continuous pulse ox and cardiac monitor  Block Region:  Trunk  Trunk - Block Type:  Abdominal plane block - TAP block    Laterality:  Bilateral  Procedures: ultrasound guided  Image captured, interpreted and electronically stored.  Strength:  1 %  Dose:  3 ml  Block Type:  Single-shot  Needle Length:  100mm  Needle Gauge:  21 G  Needle Localization:  Ultrasound guidance  Injection Assessment:  Negative aspiration for heme, no paresthesia on injection, incremental injection and local visualized surrounding nerve on ultrasound  Evidence of intravascular injection: No

## 2023-08-30 NOTE — ED PROVIDER NOTES
LABOR & DELIVERY TRIAGE HISTORY AND PHYSICAL  Patient Name: Lora Eaton Age/Sex: 32 y.o. female  : 1990 MRN: [id number]      HISTORY OF PRESENT ILLNESS:   Lora Eaton is a 32 y.o.  at 24w2d weeks gestation by LMP. She receives her care with a midwife Ayaan Fuentes. She reports that she has not had labwork or an anatomy ultrasound with this pregnancy. She has been lyssa since . Her midwife checked her yesterday and said that she was closed. Today she continues to contract and is breathing though contractions but able to converse.     Fetal movement: yes  Leakage of Fluid: no  Vaginal Bleeding: yes, bloody show  Contractions: yes    Her EDC is 2023, by Patient Reported.   She has received prenatal care with a midwife.  Complications during pregnancy:    contractions  History of one full term delivery     OBSTETRICAL HISTORY:  OB History    Para Term  AB Living   1             SAB IAB Ectopic Molar Multiple Live Births                    # Outcome Date GA Lbr Kleber/2nd Weight Sex Delivery Anes PTL Lv   1 Current                MEDICAL HISTORY:  No past medical history on file.    SURGICAL HISTORY:  No past surgical history on file.    MEDICATIONS:  No medications prior to admission.       ALLERGIES:  No Known Allergies     SOCIAL HISTORY:   Tobacco use: no  Alcohol use: no  Recreational drug use: no       FAMILY HISTORY:  Clotting/bleeding disorders: no  Gynecological cancers: no  No family history on file.    REVIEW OF SYSTEMS:  Pertinent items are noted in HPI.      PHYSICAL EXAM:  Temp:  [36.1 °C (97 °F)] 36.1 °C (97 °F)  Pulse:  [118] 118  Resp:  [16] 16  BP: (110)/(71) 110/71  SpO2:  [100 %] 100 %  Body mass index is 21.52 kg/m².    General:  AAOx3, NAD, pleasant disposition   Pulmonary:  Non-labored respirations   Abdomen: Soft and non-tender, gravid uterus   FHT: 155/mod/+10x10 accels/possible early   Contractions: Yes, irregular   Presentation:  Breech by bedside ultrasound    SVE: Bulging membranes     Bedside Ultrasound:   Breech presentation     Prenatal Labs:  HepBSAg pending  HIV pending  RPR pending  Rubella pending  ABO pending    Group B Strep: pending      ASSESSMENT/ PLAN:   Lora Eaton is a 32 y.o.  at 24w2d gestation who presents with contractions and was found to have a bulging membranes on exam  - Breech presentation with uncertain dating    1. Admit to L&D  2. Magnesium sulfate for neuroprotection  3. BTMZ ordered  4. Indocin for tocolysis  5. MFM in house, asked to perform ultrasound for dating and cervix evaluation   6. PCN for GBS ppx  7. Patient counseled on breech presentation and likely need for  section   8. Prenatal labs and GBS pending       Brandy Muñoz M.D.  Obstetrics and Gynecology  2023    5:27 PM

## 2023-08-30 NOTE — CONSULTS
DATE OF SERVICE:  2023     HISTORY OF PRESENT ILLNESS:  This is a 32-year-old  2, para 1, EDC of   2023, 24 weeks and 2 days who presented with a history of cramping with   starting onset on  every 3-5 minutes.  She reports on Monday, she   continued to have contractions and came in today because of bloody show.  She   was examined and bulging bag of water was palpated and the vaginal vault.     The patient reports care has been through a lay midwife. It is unknown whether   there any assessments were done in the past 2-3 days.     PAST MEDICAL HISTORY:  She denies any major medical problems including asthma,   seizures, hypertension, cardiovascular, GI or  diseases.     PREVIOUS OPERATIONS:  Left endometrioma.     TRANSFUSIONS:  None.     ALLERGIES:  None.     VENEREAL DISEASE HISTORY:  None.     HABITS:  None.     CURRENT MEDICATIONS:  None.     PAST OBSTETRICAL HISTORY:  In , term male, 8 pounds 14 ounce, , no   complications.     PHYSICAL EXAMINATION:  GENERAL:  Well-developed, well-nourished female, alert and oriented x3, in   moderate discomfort.  She is currently on IV magnesium sulfate and did receive   indomethacin.     DIAGNOSTIC DATA:  Ultrasound was performed at the bedside revealed a oliveros   fetus in a breech presentation and bulging bag of Reyes in the vaginal   vault.  The bulging bag of reyes measured approximately 87.9 x 94.7 cm in   diameters in the vaginal vault.  The cervix appeared to be dilated to 6 cm.    Fetus is in a breech with the buttocks just past the what is presumptive   internal os.  Biometry of the fetus was consistent with a 25 week and 0 day   fetus.  Estimated fetal weight 757 grams and appears to be male.  Fetal   anatomy was appropriate for age including CNS, limbs, internal abdominal spine   including renal genitourinary system as well as fetal face.  The fetal heart   rate was normal, except for the presence aneurysmal foramen ovale.   CISCO was   normal at 14.96.  Placenta is posterior in location and there appeared to be   an intra-amniotic sludging. Contractions were regular approximately every 5-7   minutes by palpation.     ASSESSMENT:  1.  Intrauterine pregnancy 24 weeks and 2 days.  2.  Breech presentation with advanced cervical change and bulging bag of   herrera.  3.  Intra-amniotic sludge.  4.  No prior history of aneuploidy screening.     RECOMMENDATIONS:  If patient should achieve tocolysis, consider continuing on   a day by day basis to obtain 48 hours of steroids; however, we discussed this   will be on a day by day assessment.  Explanations provided for the rationale   for magnesium sulfate as well as betamethasone.  We discussed that the gaining   of time can be extremely valuable for the fetus.  However, with her degree of   presentation and descent, vaginal delivery may be inevitable.  Ideally,    birth would be recommended.     The patient's bladder was quite full and after Suero catheter was inserted,   heart tones were not detectable and fetal bradycardia was determined to be   present and the patient was taken to the operating room.    Inpatient comprehensive consultation with time of evaluation, consultation, discussion of care with patient, partner and attending, 90 minutes.    ______________________________  MD PAWAN JOSEPH/GHADA    DD:  2023 18:58  DT:  2023 19:54    Job#:  [id number]

## 2023-08-30 NOTE — PROGRESS NOTES
1525: Pt presents to L&D with c/o UCs starting . Pt states that she had been feeling ill over the weekend with body aches and did not realize the pain was UCs until yesterday. Currently pt reports UCs approximately 5-8 min apart. Pt also noted some blood tinged mucus discharge today while using the restroom. Reports + FM.  Pt receives care from Ayaan Cowan midwife. Pt reports ROSE as 23. Pt reports that she was seen by her midwife yesterday and a cervical exam was performed. Per pt, midwife stated her cervix was closed, but thin. External monitors placed. Vitals obtained.     1625: Dr. Olivarez and Dr. Muñoz at bedside. POC discussed with pt and family.    1650: Dr. Muñoz at bedside to perform sterile speculum exam.     1700: Report given to Susan SAGE RN. POC discussed.

## 2023-08-30 NOTE — LACTATION NOTE
"This note was copied from a baby's chart.  24.3 weeks baby demise, . MOB asleep, left \"Stopping your milk supply\" & \"Lactation Support After the Loss of Your Baby\" info sheets & breast pads with patients nurse. Patient's nurse will call LC when patient wakes if needed, this mother  1st baby.  "

## 2023-08-30 NOTE — PROGRESS NOTES
Patient in OR 2 at 1834. RN attempted to get FHR. Patient was given a splash prep of betadine to promptly deliver infant.  1840 report received from IAN Davis  Male infant born at 1842. NICU in OR 2 working with infant.    APGARs assigned by NICU MD Glover.    2000 POC discussed with patient and FOB. Patient encouraged to voice concerns or needs. Patient verbalized understanding of information given.     2132 Patient transferred to [facility]. FOB and infant at bedside in cooling cot.     0540 Patient sitting up at bedside, and also standing at bedside with RN. Per patient minimal pain. Some dizziness so patient placed back to bed. Patient in stable condition.    1900 Report given to Tala CAUSEY RN. Care relinquished.

## 2023-08-30 NOTE — PROGRESS NOTES
1700- Report received from IAN Rubio. IAN Allred at bedside starting PIV and collecting swabs. Dr Márquez at bedside. Orders received to give 6g Mag bolus with 2g Maitenence dose, Betamethasone STAT, and Indocin. 9see MAR for administration times).     1740- Dr Flower at bedside to perform bedside ultrasound. Dr Muñoz and Dr Berrios at bedside. Mag bolus is running. Pt VSS.   1744- EFM and TOCO off pt for ultrasound.   1820- Pt c/o full bladder during ultrasound with Dr Flower. Orders to place Suero. Suero cath placed and draining clear yellow urine to gravity. Pt tolerated well.   1828- Unable to get accurate FHR with EFM. Dr Berrios at bedside and will ultrasound to find HR.   1832- Ultrasound with low FH. Orders to proceed to emergent C/S. Charge Tevin SOSA called and made aware. IAN Allred at bedside to assist.   1834- Pt in OR. Mag infusion stopped and disconnected.   1836- Fetal HR in 100's and deceling. Orders to proceed with crash c/s. Dr London (anesthesia) prepping for general anesthesia. NICU and RT on way to delivery. Report given to oncoming RN.

## 2023-08-31 ENCOUNTER — PHARMACY VISIT (OUTPATIENT)
Dept: PHARMACY | Facility: MEDICAL CENTER | Age: 33
End: 2023-08-31
Payer: COMMERCIAL

## 2023-08-31 LAB
ACTION RH IMMUNE GLOB 8505RHG: NORMAL
ADULT RBCS COUNTED 8505ARB2: 4950 ADULT RBC
FETAL RBC PERCENT 8505FRBP: 0.14 %
FETAL STAIN FRBC 8505FRBC: 7 FETAL RBC
NUMBER OF RH DOSES IND 8505RD: 2
NUMBER OF RH DOSES IND 8505RD: 2 # RHIG
WEAK D AG RBC QL: NORMAL

## 2023-08-31 PROCEDURE — 700102 HCHG RX REV CODE 250 W/ 637 OVERRIDE(OP): Performed by: OBSTETRICS & GYNECOLOGY

## 2023-08-31 PROCEDURE — 86901 BLOOD TYPING SEROLOGIC RH(D): CPT

## 2023-08-31 PROCEDURE — RXMED WILLOW AMBULATORY MEDICATION CHARGE: Performed by: OBSTETRICS & GYNECOLOGY

## 2023-08-31 PROCEDURE — A9270 NON-COVERED ITEM OR SERVICE: HCPCS | Performed by: OBSTETRICS & GYNECOLOGY

## 2023-08-31 PROCEDURE — A9270 NON-COVERED ITEM OR SERVICE: HCPCS | Performed by: FAMILY MEDICINE

## 2023-08-31 PROCEDURE — 36415 COLL VENOUS BLD VENIPUNCTURE: CPT

## 2023-08-31 PROCEDURE — 85460 HEMOGLOBIN FETAL: CPT

## 2023-08-31 PROCEDURE — 700102 HCHG RX REV CODE 250 W/ 637 OVERRIDE(OP): Performed by: FAMILY MEDICINE

## 2023-08-31 PROCEDURE — 3E0334Z INTRODUCTION OF SERUM, TOXOID AND VACCINE INTO PERIPHERAL VEIN, PERCUTANEOUS APPROACH: ICD-10-PCS | Performed by: OBSTETRICS & GYNECOLOGY

## 2023-08-31 RX ORDER — ACETAMINOPHEN 500 MG
1000 TABLET ORAL EVERY 6 HOURS PRN
Qty: 30 TABLET | Refills: 0 | COMMUNITY
Start: 2023-08-31

## 2023-08-31 RX ORDER — OXYCODONE HYDROCHLORIDE 5 MG/1
5 TABLET ORAL EVERY 4 HOURS PRN
Qty: 10 TABLET | Refills: 0 | Status: SHIPPED | OUTPATIENT
Start: 2023-08-31 | End: 2023-09-07

## 2023-08-31 RX ORDER — IBUPROFEN 800 MG/1
800 TABLET ORAL 3 TIMES DAILY PRN
Qty: 30 TABLET | Refills: 0 | COMMUNITY
Start: 2023-08-31

## 2023-08-31 RX ADMIN — ACETAMINOPHEN 1000 MG: 500 TABLET, FILM COATED ORAL at 13:51

## 2023-08-31 RX ADMIN — IBUPROFEN 800 MG: 800 TABLET, FILM COATED ORAL at 07:34

## 2023-08-31 NOTE — DISCHARGE SUMMARY
Discharge Summary:      Lora Eaton    Admit Date:   2023  Discharge Date:  2023     Admitting diagnosis:   labor at 24+2/7 weeks  Discharge Diagnosis: Status post classical   Pregnancy Complications: lay midwife care, PTL,  demise          History:  History reviewed. No pertinent past medical history.  OB History    Para Term  AB Living   2 2 1 1   1   SAB IAB Ectopic Molar Multiple Live Births           0 1      # Outcome Date GA Lbr Kleber/2nd Weight Sex Delivery Anes PTL Lv   2  23 24w2d  0.71 kg (1 lb 9 oz) M CS-LTranv Gen  ND   1 Term                 Patient has no known allergies.  Patient Active Problem List    Diagnosis Date Noted    Indication for care in labor or delivery 2023        Hospital Course:   32 y.o. , now para 2, was admitted for  labor.  The patient's was noted to be 6 cm dilated, in breech presentation, at 24-2/7 weeks.  She had received late midwife care.  She was taken to the OR emergently for fetal bradycardia, and underwent stat classical  section.   death occurred approximately 2 hours after delivery.  Patient postpartum course was unremarkable, with progressive advancement in diet , ambulation and toleration of oral analgesia. Patient without complaints today and desires discharge.  She states her mood is stable, denies depressive or anxiety symptoms.    Vitals:    23 0846 23 1242 23 1628 23   BP: 108/68 112/65 111/66 108/64   Pulse: 75 97 87 82   Resp: 15 16 17 16   Temp: 36.1 °C (96.9 °F) 36.6 °C (97.8 °F) 36.4 °C (97.6 °F) 36.4 °C (97.5 °F)   TempSrc: Temporal Temporal Temporal Temporal   SpO2:  96% 97%    Weight:       Height:           Current Facility-Administered Medications   Medication Dose    oxyCODONE (Roxicodone) oral solution 5 mg  5 mg    Or    oxyCODONE (Roxicodone) oral solution 10 mg  10 mg    Pharmacy Consult Request ...Pain Management  Review 1 Each  1 Each    ibuprofen (Motrin) tablet 800 mg  800 mg    acetaminophen (Tylenol) tablet 1,000 mg  1,000 mg    lactated ringers infusion      docusate sodium (Colace) capsule 100 mg  100 mg    tetanus-dipth-acell pertussis (Tdap) inj 0.5 mL  0.5 mL    measles, mumps and rubella vaccine (Mmr) injection 0.5 mL  0.5 mL    oxyCODONE immediate-release (Roxicodone) tablet 5 mg  5 mg    ondansetron (Zofran) syringe/vial injection 4 mg  4 mg    Or    ondansetron (Zofran ODT) dispertab 4 mg  4 mg    diphenhydrAMINE (Benadryl) tablet/capsule 25 mg  25 mg    Or    diphenhydrAMINE (Benadryl) injection 25 mg  25 mg       Exam:  General -alert, in no apparent distress  Abdomen: Abdomen soft, non-tender. BS normal. No masses,  No organomegaly  Fundus Non Tender: yes  Incision:  Dressing dry, intact  Perineum: perineum intact  Extremity: no edema, calves NT     Labs:  Recent Labs     23  1648 23  0605   WBC 7.9 11.9*   RBC 4.94 4.50   HEMOGLOBIN 13.2 12.1   HEMATOCRIT 40.3 37.1   MCV 81.6 82.4   MCH 26.7* 26.9*   MCHC 32.8 32.6   RDW 40.8 41.8   PLATELETCT 168 160*   MPV 12.1 12.6        Activity:   Discharge to home  Pelvic Rest x 6 weeks    Assessment:  Postoperative day #2 status post emergency classical  section for fetal bradycardia at 24 weeks, complicated by  demise.    Patient is noted to be Rh-.  Unfortunately, the infant's blood type was not determined.  RhoGAM was recommended prior to discharge.    Patient is rubella nonimmune -I recommended MMR vaccination, patient declines    Swab was positive for Gardnerella -the patient denies abnormal vaginal discharge or symptoms.  She declines treatment.      Follow up: .10 days for incision check and assessment for postpartum depression.    Discharge Meds:   Current Outpatient Medications   Medication Sig Dispense Refill    acetaminophen (TYLENOL) 500 MG Tab Take 2 Tablets by mouth every 6 hours as needed for Mild Pain. 30 Tablet 0     ibuprofen (MOTRIN) 800 MG Tab Take 1 Tablet by mouth 3 times a day as needed for Moderate Pain. 30 Tablet 0    oxyCODONE immediate-release (ROXICODONE) 5 MG Tab Take 1 Tablet by mouth every four hours as needed for Severe Pain for up to 7 days. 10 Tablet 0       Jordyn Arana M.D.

## 2023-08-31 NOTE — PROGRESS NOTES
1900: report received from Day shift; POC discussed. Fetal demise paperwork completed. Infant still at bedside in the cool cradle. Family visiting through out the day and at bedside at this time.  2000: In to see pt and spouse; both teary eyed. Introduced myself and offered condolences. Encouraged them to call for any assistance. Family at bedside.    2100: pt requesting pain management,Tylenol. Pt states she received Motrin at around 6pm tonight but no Tylenol.  2111: Tylenol 1,000mg po given..order modified to PRN per pt request. Pt aware medication is every 6 hours.    2300: In to see pt; assessment done, dressing over incision CDI. Pt's lochia small. No c/o cramping nor clots or gushes.  Infant at bedside in cool cradle; cradle covered with a towel to keep baby cooler.  Sequentials placed per pt request.     0300: pt and spouse have been resting quietly this evening with no needs at this time.

## 2023-08-31 NOTE — DISCHARGE PLANNING
:     This SW and MOE Esme met with pt at bedside to provide resources and support. Pt stated she is doing okay and feels well supported by her family, friends, and [place] community. SW provided pt with rural and local mutuary options, as well as out pt mental health resources and  loss support group information. Pt appeared teary eyed, but very thankful. SW provided empathy and condolences. SW will continue to follow for any other needs.

## 2023-08-31 NOTE — PROGRESS NOTES
"POD # 1 S/P  PRIMARY      SUBJECTIVE: Pain well controlled.  Minimal vaginal bleeding.  Tolerating regular diet.  Passing flatus.  No BM.  Ambulating without lightheadedness or dizziness. No depression or anxiety.    OBJECTIVE:  /64   Pulse 82   Temp 36.4 °C (97.5 °F) (Temporal)   Resp 16   Ht 1.778 m (5' 10\")   Wt 68 kg (150 lb)   SpO2 97%      GENERAL: Alert, in no apparent distress  PSYCHIATRIC: Appropriate affect, intact insight and judgement.  ABDOMEN: Soft, nontender, nondistended.  No rebound or guarding.  Fundus firm, NT, at umbilicus.  Dressing dry and intact.  EXTREMITIES: No edema, calves NT    Recent Labs     /  1648 //  0605   WBC 7.9 11.9*   RBC 4.94 4.50   HEMOGLOBIN 13.2 12.1   HEMATOCRIT 40.3 37.1   MCV 81.6 82.4   MCH 26.7* 26.9*   RDW 40.8 41.8   PLATELETCT 168 160*   MPV 12.1 12.6   NEUTSPOLYS 86.70*  --    LYMPHOCYTES 6.70*  --    MONOCYTES 5.10  --    EOSINOPHILS 0.40  --    BASOPHILS 0.30  --         ASSESSMENT: POD # 1 S/P PRIMARY  COMPLICATED BY  DEMISE      PLAN: Routine post op care.  Anticipate discharge in AM  "

## 2023-09-01 NOTE — DISCHARGE PLANNING
Meds-to-Beds: Discharge prescription orders listed below delivered to patient's bedside. RN Johana notified. Patient counseled. Patient elected to have co-payment billed to patient account.       Current Outpatient Medications   Medication Sig Dispense Refill    oxyCODONE immediate-release (ROXICODONE) 5 MG Tab Take 1 Tablet by mouth every four hours as needed for Severe Pain for up to 7 days. 10 Tablet 0      Lucy Montanez, PharmD

## (undated) DEVICE — CANNULA O2 COMFORT SOFT EAR ADULT 7 FT TUBING (50/CA)

## (undated) DEVICE — BLANKET UNDERBODY FULL ACCES - (5/CA)

## (undated) DEVICE — SUTURE 3-0 VICRYL PLUS CT-1 - 36 INCH (36/BX)

## (undated) DEVICE — CHLORAPREP 3 ML APPLICATOR - (25/BX 4BX/CS 100/CS)

## (undated) DEVICE — CHLORAPREP 26 ML APPLICATOR - ORANGE TINT(25/CA)

## (undated) DEVICE — SWABSTICK BENZOIN SINGLE (50EA/BX)

## (undated) DEVICE — SENSOR SPO2 ADULT LNCS ADTX (20/BX) ORDER ITEM #19593

## (undated) DEVICE — PACK C-SECTION (2EA/CA)

## (undated) DEVICE — PAD GROUNDING PRE-JELLED - (50EA/PK)

## (undated) DEVICE — SUTURE 0 VICRYL PLUS CT-1 - 36 INCH (36/BX)

## (undated) DEVICE — SUTURE 4-0 27IN VCRL PLUS ANTI (36PK/BX)

## (undated) DEVICE — PACK ROOM TURNOVER L&D (12/CA)

## (undated) DEVICE — WRAP PROBE OXIMETER INFANT UNIVERSAL DESIGN TO FIT NEONATAL FOOT INFANT TOE OR PED FINGER  (12EA/BX)

## (undated) DEVICE — TRAY SPINAL ANESTHESIA NON-SAFETY (10/CA)

## (undated) DEVICE — CLOSURE SKIN STRIP 1/2 X 4 IN - (STERI STRIP) (50/BX 4BX/CA)

## (undated) DEVICE — SET EXTENSION WITH 2 PORTS (48EA/CA) ***PART #2C8610 IS A SUBSTITUTE*****

## (undated) DEVICE — TUBE CONNECT SUCTION CLEAR 120 X 1/4" (50EA/CA)"

## (undated) DEVICE — SODIUM CHL IRRIGATION 0.9% 1000ML (12EA/CA)

## (undated) DEVICE — SUCTION INSTRUMENT YANKAUER BULBOUS TIP W/O VENT (50EA/CA)

## (undated) DEVICE — GLOVE BIOGEL PI ORTHO SZ 6 SURGICAL PF LF (40PR/BX)

## (undated) DEVICE — WATER IRRIGATION STERILE 1000ML (12EA/CA)

## (undated) DEVICE — CANISTER SUCTION 3000ML MECHANICAL FILTER AUTO SHUTOFF MEDI-VAC NONSTERILE LF DISP  (40EA/CA)

## (undated) DEVICE — PLUMEPEN ULTRA 3/8 IN X 10 FT HOSE (20EA/CA)

## (undated) DEVICE — SUTURE ABSORBABLE MONOFILAMENT VIOLET MONOCRYL CT-1 L36 IN (36EA/BX)

## (undated) DEVICE — BAG SPONGE COUNT 10.25 X 32 - BLUE (250/CA)

## (undated) DEVICE — TRAY FOLEY CATHETER STATLOCK 16FR SURESTEP  (10EA/CA)

## (undated) DEVICE — SLEEVE, SEQUENTIAL CALF REG

## (undated) DEVICE — HEAD HOLDER JUNIOR/ADULT

## (undated) DEVICE — BLANKET STERILE CHICKIE FOR L&D (100/CA)

## (undated) DEVICE — KIT  I.V. START (100EA/CA)

## (undated) DEVICE — SENSOR SPO2 NEO LNCS ADHESIVE (20/BX) SEE USER NOTES

## (undated) DEVICE — CATHETER IV NON-SAFETY 18 GA X 1 1/4 (50/BX 4BX/CA)

## (undated) DEVICE — ELECTRODE RADIOLUCENT LF 3PK - RED DOT (3/PK 200PK/CA)

## (undated) DEVICE — SOLUTION PLASMA-LYTE PH 7.4 INJ 1000ML  (14EA/CA)